# Patient Record
Sex: FEMALE | Race: BLACK OR AFRICAN AMERICAN | NOT HISPANIC OR LATINO | Employment: FULL TIME | URBAN - METROPOLITAN AREA
[De-identification: names, ages, dates, MRNs, and addresses within clinical notes are randomized per-mention and may not be internally consistent; named-entity substitution may affect disease eponyms.]

---

## 2022-11-21 PROBLEM — N63.20 LEFT BREAST MASS: Status: ACTIVE | Noted: 2022-11-21

## 2022-11-21 PROBLEM — E27.8 ADRENAL NODULE: Status: ACTIVE | Noted: 2022-11-21

## 2022-11-21 PROBLEM — I10 HYPERTENSION: Status: ACTIVE | Noted: 2022-11-21

## 2022-11-21 PROBLEM — E27.9 ADRENAL NODULE: Status: ACTIVE | Noted: 2022-11-21

## 2023-12-01 ENCOUNTER — TELEPHONE (OUTPATIENT)
Dept: ADMINISTRATIVE | Facility: HOSPITAL | Age: 38
End: 2023-12-01

## 2023-12-01 NOTE — TELEPHONE ENCOUNTER
Pt returned outreach call to report high home readings.   She decline scheduling for check. Reports home bp reading taken 11/29/23@165/112

## 2024-02-06 PROBLEM — G47.30 SLEEP-DISORDERED BREATHING: Status: ACTIVE | Noted: 2024-02-06

## 2024-04-02 PROBLEM — G47.33 OSA (OBSTRUCTIVE SLEEP APNEA): Status: ACTIVE | Noted: 2024-02-06

## 2024-09-05 ENCOUNTER — PATIENT MESSAGE (OUTPATIENT)
Dept: ADMINISTRATIVE | Facility: OTHER | Age: 39
End: 2024-09-05

## 2025-06-10 ENCOUNTER — PATIENT OUTREACH (OUTPATIENT)
Dept: ADMINISTRATIVE | Facility: HOSPITAL | Age: 40
End: 2025-06-10

## 2025-06-10 NOTE — PROGRESS NOTES
6-9-25 please address open care gap mammogram and cervical cancer screening, please offer self swab during visit, noted on 6-10-25 appt notes